# Patient Record
Sex: FEMALE | Race: WHITE | NOT HISPANIC OR LATINO | Employment: FULL TIME | URBAN - METROPOLITAN AREA
[De-identification: names, ages, dates, MRNs, and addresses within clinical notes are randomized per-mention and may not be internally consistent; named-entity substitution may affect disease eponyms.]

---

## 2019-02-18 RX ORDER — HYDROCODONE BITARTRATE AND ACETAMINOPHEN 5; 325 MG/1; MG/1
1 TABLET ORAL EVERY 6 HOURS PRN
COMMUNITY

## 2019-02-18 RX ORDER — GABAPENTIN 600 MG/1
600 TABLET ORAL 3 TIMES DAILY
COMMUNITY
End: 2019-02-19

## 2019-02-18 RX ORDER — METHOCARBAMOL 750 MG/1
750 TABLET, FILM COATED ORAL 4 TIMES DAILY PRN
COMMUNITY

## 2019-02-19 ENCOUNTER — OFFICE VISIT (OUTPATIENT)
Dept: NEUROSURGERY | Facility: CLINIC | Age: 49
End: 2019-02-19

## 2019-02-19 VITALS — HEIGHT: 64 IN | TEMPERATURE: 97.7 F | WEIGHT: 208 LBS | BODY MASS INDEX: 35.51 KG/M2

## 2019-02-19 DIAGNOSIS — Z98.890 HX OF CERVICAL SPINE SURGERY: ICD-10-CM

## 2019-02-19 DIAGNOSIS — G56.03 BILATERAL CARPAL TUNNEL SYNDROME: ICD-10-CM

## 2019-02-19 DIAGNOSIS — M48.02 SPINAL STENOSIS IN CERVICAL REGION: ICD-10-CM

## 2019-02-19 DIAGNOSIS — M47.812 CERVICAL SPONDYLOSIS WITHOUT MYELOPATHY: ICD-10-CM

## 2019-02-19 DIAGNOSIS — M54.12 CERVICAL RADICULOPATHY: Primary | ICD-10-CM

## 2019-02-19 DIAGNOSIS — M50.30 DDD (DEGENERATIVE DISC DISEASE), CERVICAL: ICD-10-CM

## 2019-02-19 PROCEDURE — 99243 OFF/OP CNSLTJ NEW/EST LOW 30: CPT | Performed by: NEUROLOGICAL SURGERY

## 2019-02-19 RX ORDER — SPIRONOLACTONE 50 MG/1
50 TABLET, FILM COATED ORAL DAILY
COMMUNITY

## 2019-02-19 RX ORDER — OMEPRAZOLE 40 MG/1
40 CAPSULE, DELAYED RELEASE ORAL DAILY
COMMUNITY

## 2019-02-19 RX ORDER — UREA 10 %
27 LOTION (ML) TOPICAL 2 TIMES DAILY
COMMUNITY

## 2019-02-19 NOTE — PROGRESS NOTES
Patient: Ashley Joyner  : 1970    Primary Care Provider: Jeannie Marin APRN    Requesting Provider: As above        History    Chief Complaint: Neck and right upper extremity pain with weakness and sensory alteration.    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  She has sensory alteration involving the median nerve innervated fingers of both hands.  In March of last year she underwent right C3-4 foraminotomy at the [facility].  This helped with some of the pain she was having in her ear region and some headache but it did not help with her arm symptoms.  She has done therapy in the past that did not include traction.  She has had epidural injections and chiropractic intervention.  She is worse with driving, charting, reading.  Rest and ice are helpful.  Her hand symptoms bother her more at night and apparently electrodiagnostic studies have demonstrated bilateral carpal tunnel syndrome, right greater than left.  Neurontin caused side effects and was abandoned.    Review of Systems   Constitutional: Negative for activity change, appetite change, chills, diaphoresis, fatigue, fever and unexpected weight change.   HENT: Positive for congestion, dental problem, postnasal drip, rhinorrhea and sinus pain. Negative for drooling, ear discharge, ear pain, facial swelling, hearing loss, mouth sores, nosebleeds, sinus pressure, sneezing, sore throat, tinnitus, trouble swallowing and voice change.    Eyes: Negative for photophobia, pain, discharge, redness, itching and visual disturbance.   Respiratory: Negative for apnea, cough, choking, chest tightness, shortness of breath, wheezing and stridor.    Cardiovascular: Positive for leg swelling. Negative for chest pain and palpitations.   Gastrointestinal: Positive for abdominal  "distention and constipation. Negative for abdominal pain, anal bleeding, blood in stool, diarrhea, nausea, rectal pain and vomiting.   Endocrine: Negative for cold intolerance, heat intolerance, polydipsia, polyphagia and polyuria.   Genitourinary: Negative for decreased urine volume, difficulty urinating, dysuria, enuresis, flank pain, frequency, genital sores, hematuria and urgency.   Musculoskeletal: Positive for arthralgias, myalgias, neck pain and neck stiffness. Negative for back pain, gait problem and joint swelling.   Skin: Negative for color change, pallor, rash and wound.   Allergic/Immunologic: Negative for environmental allergies, food allergies and immunocompromised state.   Neurological: Negative for dizziness, tremors, seizures, syncope, facial asymmetry, speech difficulty, weakness, light-headedness, numbness and headaches.   Hematological: Positive for adenopathy. Does not bruise/bleed easily.   Psychiatric/Behavioral: Negative for agitation, behavioral problems, confusion, decreased concentration, dysphoric mood, hallucinations, self-injury, sleep disturbance and suicidal ideas. The patient is not nervous/anxious and is not hyperactive.        The patient's past medical history, past surgical history, family history, and social history have been reviewed at length in the electronic medical record.    Physical Exam:   Temp 97.7 °F (36.5 °C) (Temporal)   Ht 162.6 cm (64\")   Wt 94.3 kg (208 lb)   BMI 35.70 kg/m²   CONSTITUTIONAL: Patient is well-nourished, pleasant and appears stated age.  CV: Heart regular rate and rhythm without murmur, rub, or gallop.  PULMONARY: Lungs are clear to ascultation.  MUSCULOSKELETAL:  Neck tenderness to palpation is not observed.   ROM in neck is normal.  Well-healed small right parasagittal incision is noted on her dorsal neck.  Tinel sign is positive at each wrist.  NEUROLOGICAL:  Orientation, memory, attention span, language function, and cognition have been " examined and are intact.  Strength is intact in the upper and lower extremities to direct testing.  Muscle tone is normal throughout.  Station and gait are normal.  Sensation is intact to light touch testing throughout.  Deep tendon reflexes are 1+ and symmetrical.  Sven's Sign is negative bilaterally. No clonus is elicited.  Coordination is intact.      Medical Decision Making    Data Review:   MRI the cervical spine dated 11/16/18 demonstrates loss of the normal lordosis.  There is some central disc bulging worse focal protrusion at C3-4.  There is soft disc herniation into the recess and proximal foramen on the right at C4-5.  There is broad-based disc-osteophyte that narrows both recesses at C5-6.    Diagnosis:   1.  Cervical radiculopathy probably emanating from the C4-5 and C5-6 levels.  2.  Mechanical neck pain.  3.  Bilateral carpal tunnel syndrome.    Treatment Options:   I referred the patient for physical therapy that will actually include traction.  I am going to prescribe wrist splints that she will wear at night.  She will follow-up in several weeks.  If this diminishes her symptoms then she may be able to get by with some of them.  If not then she may require ACDF at C4-6.  She will bring her electrodiagnostic study results at the time of the next visit.       Diagnosis Plan   1. Cervical radiculopathy  Ambulatory Referral to Physical Therapy Evaluate and treat (Hx of Cervical spine surgery )   2. Cervical spondylosis without myelopathy     3. DDD (degenerative disc disease), cervical     4. Spinal stenosis in cervical region     5. Bilateral carpal tunnel syndrome   Wrist Hand Orthosis, Wrist Extension Control Cock-up   6. Hx of cervical spine surgery         Scribed for Juan Feliciano MD by Mariaelena Kebede CMA on 2/19/2019 10:08 AM       I, Dr. Feliciano, personally performed the services described in the documentation, as scribed in my presence, and it is both accurate and complete.